# Patient Record
Sex: FEMALE | Race: WHITE | NOT HISPANIC OR LATINO | URBAN - METROPOLITAN AREA
[De-identification: names, ages, dates, MRNs, and addresses within clinical notes are randomized per-mention and may not be internally consistent; named-entity substitution may affect disease eponyms.]

---

## 2019-09-16 PROBLEM — H40.053 OCULAR HYPERTENSION: Noted: 2020-03-04

## 2019-09-16 PROBLEM — H04.123 DRY EYE SYNDROME: Noted: 2021-05-03

## 2019-09-16 PROBLEM — H40.1132 POAG, MODERATE: Noted: 2021-05-17

## 2019-09-16 PROBLEM — Z96.1 PSEUDOPHAKIA: Noted: 2021-08-30

## 2019-09-16 PROBLEM — H35.372 EPIRETINAL MEMBRANE: Status: STABILIZING | Noted: 2021-08-30 | Resolved: 2020-05-22

## 2019-09-16 PROBLEM — H04.123 DRY EYE SYNDROME: Status: ACTIVE | Noted: 2021-05-03 | Resolved: 2020-05-22

## 2019-09-16 PROBLEM — H17.13 CENTRAL CORNEAL OPACITY: Noted: 2019-12-27

## 2019-09-16 PROBLEM — H16.013 CENTRAL CORNEAL ULCER: Noted: 2019-09-16

## 2019-09-16 PROBLEM — H17.13 CENTRAL CORNEAL OPACITY: Status: STABILIZING | Noted: 2019-12-27 | Resolved: 2020-05-22

## 2019-09-16 PROBLEM — H35.372 EPIRETINAL MEMBRANE: Noted: 2021-08-30

## 2019-09-16 PROBLEM — H25.11 NUCLEAR SCLEROSIS: Noted: 2021-05-03

## 2019-09-16 PROBLEM — H40.1132 POAG, MODERATE: Status: STABILIZING | Noted: 2021-05-17 | Resolved: 2020-05-22

## 2019-09-16 PROBLEM — G44.209 TENSION HEADACHE: Noted: 2020-05-22

## 2019-09-16 PROBLEM — Z96.1 PSEUDOPHAKIA: Status: STABILIZING | Noted: 2021-08-30 | Resolved: 2020-05-22

## 2019-09-16 PROBLEM — H52.4 PRESBYOPIA: Noted: 2021-08-30

## 2019-09-16 PROBLEM — H17.13 CENTRAL CORNEAL OPACITY: Noted: 2019-12-27 | Resolved: 2020-05-22

## 2019-09-16 PROBLEM — H25.813 COMBINED SENILE CATARACT: Noted: 2019-09-16

## 2021-08-30 ENCOUNTER — PREPPED CHART (OUTPATIENT)
Dept: URBAN - METROPOLITAN AREA CLINIC 93 | Facility: CLINIC | Age: 71
End: 2021-08-30

## 2021-08-30 PROBLEM — Z96.1 PSEUDOPHAKIA: Status: STABILIZING | Noted: 2021-08-30

## 2021-08-30 PROBLEM — H04.123 DRY EYE SYNDROME: Status: ACTIVE | Noted: 2021-05-03

## 2021-08-30 PROBLEM — H17.13 CENTRAL CORNEAL OPACITY: Status: STABILIZING | Noted: 2019-12-27

## 2021-08-30 PROBLEM — H40.1132 POAG, MODERATE: Status: STABILIZING | Noted: 2021-05-17 | Resolved: 2020-05-22

## 2021-08-30 PROBLEM — H40.1132 POAG, MODERATE: Status: STABILIZING | Noted: 2021-05-17

## 2021-08-30 PROBLEM — H04.123 DRY EYE SYNDROME: Status: ACTIVE | Noted: 2021-05-03 | Resolved: 2020-05-22

## 2021-08-30 PROBLEM — Z96.1 PSEUDOPHAKIA: Status: STABILIZING | Noted: 2021-08-30 | Resolved: 2020-05-22

## 2021-08-30 PROBLEM — H35.372 EPIRETINAL MEMBRANE: Status: STABILIZING | Noted: 2021-08-30

## 2021-08-30 PROBLEM — H35.372 EPIRETINAL MEMBRANE: Status: STABILIZING | Noted: 2021-08-30 | Resolved: 2020-05-22

## 2021-08-30 PROBLEM — H17.13 CENTRAL CORNEAL OPACITY: Status: STABILIZING | Noted: 2019-12-27 | Resolved: 2020-05-22

## 2021-08-30 PROBLEM — H17.13 CENTRAL CORNEAL OPACITY: Status: IMPROVING | Noted: 2019-12-27

## 2021-10-15 ENCOUNTER — APPOINTMENT (OUTPATIENT)
Age: 71
Setting detail: DERMATOLOGY
End: 2021-10-22

## 2021-10-15 DIAGNOSIS — L82.0 INFLAMED SEBORRHEIC KERATOSIS: ICD-10-CM

## 2021-10-15 DIAGNOSIS — L60.1 ONYCHOLYSIS: ICD-10-CM

## 2021-10-15 DIAGNOSIS — L82.1 OTHER SEBORRHEIC KERATOSIS: ICD-10-CM

## 2021-10-15 PROBLEM — D23.61 OTHER BENIGN NEOPLASM OF SKIN OF RIGHT UPPER LIMB, INCLUDING SHOULDER: Status: ACTIVE | Noted: 2021-10-15

## 2021-10-15 PROCEDURE — OTHER ADDITIONAL NOTES: OTHER

## 2021-10-15 PROCEDURE — OTHER COUNSELING: OTHER

## 2021-10-15 PROCEDURE — OTHER LIQUID NITROGEN: OTHER

## 2021-10-15 PROCEDURE — 99202 OFFICE O/P NEW SF 15 MIN: CPT | Mod: 25

## 2021-10-15 PROCEDURE — OTHER MIPS QUALITY: OTHER

## 2021-10-15 PROCEDURE — 17110 DESTRUCT B9 LESION 1-14: CPT

## 2021-10-15 ASSESSMENT — LOCATION ZONE DERM
LOCATION ZONE: TOE
LOCATION ZONE: TRUNK

## 2021-10-15 ASSESSMENT — LOCATION DETAILED DESCRIPTION DERM
LOCATION DETAILED: LEFT INFERIOR LATERAL UPPER BACK
LOCATION DETAILED: LEFT DISTAL PLANTAR GREAT TOE

## 2021-10-15 ASSESSMENT — LOCATION SIMPLE DESCRIPTION DERM
LOCATION SIMPLE: LEFT UPPER BACK
LOCATION SIMPLE: LEFT GREAT TOE

## 2021-10-15 NOTE — PROCEDURE: ADDITIONAL NOTES
Detail Level: Simple
Additional Notes: Patient defers oral medication treatment.
Render Risk Assessment In Note?: no

## 2021-10-15 NOTE — PROCEDURE: LIQUID NITROGEN
Render Post-Care Instructions In Note?: no
Duration Of Freeze Thaw-Cycle (Seconds): 5-10
Show Topical Anesthesia Variable?: Yes
Medical Necessity Clause: This procedure was medically necessary because the lesions that were treated were:
Medical Necessity Information: It is in your best interest to select a reason for this procedure from the list below. All of these items fulfill various CMS LCD requirements except the new and changing color options.
Detail Level: Detailed
Post-Care Instructions: I reviewed with the patient in detail post-care instructions. Patient is to wear sunprotection, and avoid picking at any of the treated lesions. Pt may apply Vaseline to crusted or scabbing areas.
Consent: The patient's consent was obtained including but not limited to risks of crusting, scabbing, blistering, scarring, darker or lighter pigmentary change, recurrence, incomplete removal and infection.
Number Of Freeze-Thaw Cycles: 2 freeze-thaw cycles

## 2021-10-18 ENCOUNTER — PROBLEM (OUTPATIENT)
Dept: URBAN - METROPOLITAN AREA CLINIC 93 | Facility: CLINIC | Age: 71
End: 2021-10-18

## 2021-10-18 DIAGNOSIS — J32.0: ICD-10-CM

## 2021-10-18 PROCEDURE — 92012 INTRM OPH EXAM EST PATIENT: CPT

## 2021-10-18 ASSESSMENT — VISUAL ACUITY
OD_CC: 20/30
OS_CC: 20/40

## 2021-10-18 ASSESSMENT — TONOMETRY: OS_IOP_MMHG: 16

## 2021-11-01 ENCOUNTER — FOLLOW UP (OUTPATIENT)
Dept: URBAN - METROPOLITAN AREA CLINIC 93 | Facility: CLINIC | Age: 71
End: 2021-11-01

## 2021-11-01 DIAGNOSIS — H40.1132: ICD-10-CM

## 2021-11-01 DIAGNOSIS — H35.372: ICD-10-CM

## 2021-11-01 PROCEDURE — 92134 CPTRZ OPH DX IMG PST SGM RTA: CPT

## 2021-11-01 PROCEDURE — 92012 INTRM OPH EXAM EST PATIENT: CPT

## 2021-11-01 ASSESSMENT — VISUAL ACUITY
OD_CC: 20/25-3
OS_CC: 20/40

## 2021-11-01 ASSESSMENT — TONOMETRY
OD_IOP_MMHG: 14
OS_IOP_MMHG: 13

## 2022-03-30 ENCOUNTER — FOLLOW UP (OUTPATIENT)
Dept: URBAN - METROPOLITAN AREA CLINIC 93 | Facility: CLINIC | Age: 72
End: 2022-03-30

## 2022-03-30 DIAGNOSIS — H40.1132: ICD-10-CM

## 2022-03-30 DIAGNOSIS — H35.372: ICD-10-CM

## 2022-03-30 PROCEDURE — 92012 INTRM OPH EXAM EST PATIENT: CPT

## 2022-03-30 ASSESSMENT — TONOMETRY
OD_IOP_MMHG: 14
OS_IOP_MMHG: 14

## 2022-03-30 ASSESSMENT — VISUAL ACUITY
OD_CC: 20/25
OS_CC: 20/30

## 2022-07-11 ENCOUNTER — COMPLETE EYE EXAM (OUTPATIENT)
Dept: URBAN - METROPOLITAN AREA CLINIC 93 | Facility: CLINIC | Age: 72
End: 2022-07-11

## 2022-07-11 DIAGNOSIS — H52.13: ICD-10-CM

## 2022-07-11 DIAGNOSIS — H52.4: ICD-10-CM

## 2022-07-11 DIAGNOSIS — H04.123: ICD-10-CM

## 2022-07-11 DIAGNOSIS — H40.1132: ICD-10-CM

## 2022-07-11 DIAGNOSIS — H52.223: ICD-10-CM

## 2022-07-11 DIAGNOSIS — H35.372: ICD-10-CM

## 2022-07-11 PROCEDURE — 92014 COMPRE OPH EXAM EST PT 1/>: CPT

## 2022-07-11 PROCEDURE — 92134 CPTRZ OPH DX IMG PST SGM RTA: CPT | Mod: NC

## 2022-07-11 PROCEDURE — 92015 DETERMINE REFRACTIVE STATE: CPT | Mod: GY

## 2022-07-11 PROCEDURE — 92133 CPTRZD OPH DX IMG PST SGM ON: CPT

## 2022-07-11 ASSESSMENT — TONOMETRY
OD_IOP_MMHG: 17
OS_IOP_MMHG: 17

## 2022-07-11 ASSESSMENT — VISUAL ACUITY
OS_CC: 20/25-2
OU_CC: J1+
OD_CC: 20/25

## 2022-11-16 ENCOUNTER — PROBLEM (OUTPATIENT)
Dept: URBAN - METROPOLITAN AREA CLINIC 93 | Facility: CLINIC | Age: 72
End: 2022-11-16

## 2022-11-16 DIAGNOSIS — H35.372: ICD-10-CM

## 2022-11-16 DIAGNOSIS — H35.363: ICD-10-CM

## 2022-11-16 PROCEDURE — 92012 INTRM OPH EXAM EST PATIENT: CPT

## 2022-11-16 PROCEDURE — 92134 CPTRZ OPH DX IMG PST SGM RTA: CPT

## 2022-11-16 ASSESSMENT — TONOMETRY
OD_IOP_MMHG: 17
OS_IOP_MMHG: 15

## 2022-11-16 ASSESSMENT — VISUAL ACUITY
OD_CC: 20/25-1
OS_CC: 20/30-1

## 2023-03-28 ENCOUNTER — APPOINTMENT (OUTPATIENT)
Dept: URBAN - METROPOLITAN AREA CLINIC 276 | Age: 73
Setting detail: DERMATOLOGY
End: 2023-04-02

## 2023-03-28 DIAGNOSIS — L30.4 ERYTHEMA INTERTRIGO: ICD-10-CM

## 2023-03-28 PROCEDURE — OTHER MIPS QUALITY: OTHER

## 2023-03-28 PROCEDURE — 99213 OFFICE O/P EST LOW 20 MIN: CPT

## 2023-03-28 PROCEDURE — OTHER COUNSELING: OTHER

## 2023-03-28 PROCEDURE — OTHER PRESCRIPTION: OTHER

## 2023-03-28 RX ORDER — DESONIDE 0.5 MG/G
CREAM TOPICAL
Qty: 60 | Refills: 3 | Status: ERX | COMMUNITY
Start: 2023-03-28

## 2023-03-28 RX ORDER — KETOCONAZOLE 20 MG/G
CREAM TOPICAL
Qty: 60 | Refills: 3 | Status: ERX | COMMUNITY
Start: 2023-03-28

## 2023-03-28 ASSESSMENT — LOCATION SIMPLE DESCRIPTION DERM: LOCATION SIMPLE: LEFT BREAST

## 2023-03-28 ASSESSMENT — LOCATION ZONE DERM: LOCATION ZONE: TRUNK

## 2023-03-28 ASSESSMENT — LOCATION DETAILED DESCRIPTION DERM: LOCATION DETAILED: LEFT INFRAMAMMARY CREASE (INNER QUADRANT)

## 2023-03-28 NOTE — HPI: RASH
How Severe Is Your Rash?: moderate
Is This A New Presentation, Or A Follow-Up?: Rash
Additional History: Medication helped until 6th Covid shot then flare up.

## 2023-03-28 NOTE — PROCEDURE: MIPS QUALITY
Quality 431: Preventive Care And Screening: Unhealthy Alcohol Use - Screening: Patient not identified as an unhealthy alcohol user when screened for unhealthy alcohol use using a systematic screening method
Quality 130: Documentation Of Current Medications In The Medical Record: Current Medications Documented
Quality 154 Part B: Falls: Risk Screening (Should Be Reported With Measure 155.): Patient screened for future fall risk; documentation of two or more falls in the past year or any fall with injury in the past year
Quality 154 Part A: Falls: Risk Assessment (Should Be Reported With Measure 155.): Falls risk assessment completed and documented in the past 12 months.
Detail Level: Detailed
Quality 226: Preventive Care And Screening: Tobacco Use: Screening And Cessation Intervention: Patient screened for tobacco use and is an ex/non-smoker
Quality 111:Pneumonia Vaccination Status For Older Adults: Pneumococcal vaccine (PPSV23) administered on or after patient’s 60th birthday and before the end of the measurement period
Quality 110: Preventive Care And Screening: Influenza Immunization: Influenza Immunization previously received during influenza season

## 2023-03-29 ENCOUNTER — RX ONLY (RX ONLY)
Age: 73
End: 2023-03-29

## 2023-03-29 RX ORDER — HYDROCORTISONE 25 MG/G
CREAM TOPICAL
Qty: 30 | Refills: 3 | Status: ERX | COMMUNITY
Start: 2023-03-29

## 2023-03-29 RX ORDER — ALCLOMETASONE DIPROPIONATE 0.5 MG/G
CREAM TOPICAL
Qty: 60 | Refills: 2 | Status: ERX | COMMUNITY
Start: 2023-03-29

## 2023-04-24 ENCOUNTER — FOLLOW UP (OUTPATIENT)
Dept: URBAN - METROPOLITAN AREA CLINIC 93 | Facility: CLINIC | Age: 73
End: 2023-04-24

## 2023-04-24 DIAGNOSIS — H40.1132: ICD-10-CM

## 2023-04-24 DIAGNOSIS — H52.13: ICD-10-CM

## 2023-04-24 DIAGNOSIS — H52.4: ICD-10-CM

## 2023-04-24 DIAGNOSIS — Z96.1: ICD-10-CM

## 2023-04-24 DIAGNOSIS — H35.363: ICD-10-CM

## 2023-04-24 DIAGNOSIS — H52.223: ICD-10-CM

## 2023-04-24 DIAGNOSIS — H35.372: ICD-10-CM

## 2023-04-24 DIAGNOSIS — H04.123: ICD-10-CM

## 2023-04-24 PROCEDURE — 92012 INTRM OPH EXAM EST PATIENT: CPT

## 2023-04-24 PROCEDURE — 92133 CPTRZD OPH DX IMG PST SGM ON: CPT

## 2023-04-24 PROCEDURE — 92083 EXTENDED VISUAL FIELD XM: CPT

## 2023-04-24 ASSESSMENT — TONOMETRY
OS_IOP_MMHG: 14
OD_IOP_MMHG: 16

## 2023-04-24 ASSESSMENT — VISUAL ACUITY
OS_CC: 20/25
OD_CC: 20/30-1

## 2023-10-11 ENCOUNTER — FOLLOW UP (OUTPATIENT)
Dept: URBAN - METROPOLITAN AREA CLINIC 93 | Facility: CLINIC | Age: 73
End: 2023-10-11

## 2023-10-11 DIAGNOSIS — H04.123: ICD-10-CM

## 2023-10-11 DIAGNOSIS — H40.1132: ICD-10-CM

## 2023-10-11 DIAGNOSIS — H17.13: ICD-10-CM

## 2023-10-11 PROCEDURE — 92012 INTRM OPH EXAM EST PATIENT: CPT

## 2023-10-11 ASSESSMENT — VISUAL ACUITY: OS_SC: 20/40

## 2023-10-11 ASSESSMENT — TONOMETRY: OS_IOP_MMHG: 16

## 2024-01-24 ENCOUNTER — ESTABLISHED COMPREHENSIVE EXAM (OUTPATIENT)
Dept: URBAN - METROPOLITAN AREA CLINIC 93 | Facility: CLINIC | Age: 74
End: 2024-01-24

## 2024-01-24 DIAGNOSIS — Z96.1: ICD-10-CM

## 2024-01-24 DIAGNOSIS — H17.13: ICD-10-CM

## 2024-01-24 DIAGNOSIS — H35.363: ICD-10-CM

## 2024-01-24 DIAGNOSIS — H04.123: ICD-10-CM

## 2024-01-24 DIAGNOSIS — H35.372: ICD-10-CM

## 2024-01-24 DIAGNOSIS — H40.1132: ICD-10-CM

## 2024-01-24 PROCEDURE — 92134 CPTRZ OPH DX IMG PST SGM RTA: CPT

## 2024-01-24 PROCEDURE — 92014 COMPRE OPH EXAM EST PT 1/>: CPT

## 2024-01-24 ASSESSMENT — TONOMETRY
OD_IOP_MMHG: 15
OS_IOP_MMHG: 15

## 2024-01-24 ASSESSMENT — VISUAL ACUITY
OS_CC: 20/30+2
OU_CC: J1
OD_CC: 20/30

## 2024-02-26 ENCOUNTER — FOLLOW UP (OUTPATIENT)
Dept: URBAN - METROPOLITAN AREA CLINIC 93 | Facility: CLINIC | Age: 74
End: 2024-02-26

## 2024-02-26 DIAGNOSIS — H04.123: ICD-10-CM

## 2024-02-26 PROCEDURE — 83861 MICROFLUID ANALY TEARS: CPT | Mod: QW,RT,QW,LT

## 2024-02-26 PROCEDURE — 92012 INTRM OPH EXAM EST PATIENT: CPT

## 2024-02-26 PROCEDURE — 83861 MICROFLUID ANALY TEARS: CPT | Mod: QW,LT,QW,RT

## 2024-02-26 ASSESSMENT — TONOMETRY
OS_IOP_MMHG: 15
OD_IOP_MMHG: 16

## 2024-02-26 ASSESSMENT — VISUAL ACUITY
OS_CC: 20/25-2
OD_CC: 20/25

## 2024-06-28 ENCOUNTER — ESTABLISHED COMPREHENSIVE EXAM (OUTPATIENT)
Dept: URBAN - METROPOLITAN AREA CLINIC 93 | Facility: CLINIC | Age: 74
End: 2024-06-28

## 2024-06-28 DIAGNOSIS — H52.223: ICD-10-CM

## 2024-06-28 DIAGNOSIS — H17.13: ICD-10-CM

## 2024-06-28 DIAGNOSIS — H04.123: ICD-10-CM

## 2024-06-28 DIAGNOSIS — H35.363: ICD-10-CM

## 2024-06-28 DIAGNOSIS — H52.4: ICD-10-CM

## 2024-06-28 DIAGNOSIS — H35.372: ICD-10-CM

## 2024-06-28 DIAGNOSIS — H40.1132: ICD-10-CM

## 2024-06-28 DIAGNOSIS — H52.13: ICD-10-CM

## 2024-06-28 DIAGNOSIS — Z96.1: ICD-10-CM

## 2024-06-28 PROCEDURE — 92015 DETERMINE REFRACTIVE STATE: CPT | Mod: GY

## 2024-06-28 PROCEDURE — 92133 CPTRZD OPH DX IMG PST SGM ON: CPT

## 2024-06-28 PROCEDURE — 92083 EXTENDED VISUAL FIELD XM: CPT

## 2024-06-28 PROCEDURE — 92134 CPTRZ OPH DX IMG PST SGM RTA: CPT | Mod: NC

## 2024-06-28 PROCEDURE — 92014 COMPRE OPH EXAM EST PT 1/>: CPT

## 2024-06-28 ASSESSMENT — TONOMETRY
OD_IOP_MMHG: 14
OS_IOP_MMHG: 14

## 2024-06-28 ASSESSMENT — VISUAL ACUITY
OS_CC: 20/30
OD_CC: 20/40

## 2024-08-28 ENCOUNTER — APPOINTMENT (OUTPATIENT)
Dept: URBAN - METROPOLITAN AREA CLINIC 276 | Age: 74
Setting detail: DERMATOLOGY
End: 2024-09-05

## 2024-08-28 DIAGNOSIS — T07XXXA INSECT BITE, NONVENOMOUS, OF OTHER, MULTIPLE, AND UNSPECIFIED SITES, WITHOUT MENTION OF INFECTION: ICD-10-CM

## 2024-08-28 DIAGNOSIS — L82.0 INFLAMED SEBORRHEIC KERATOSIS: ICD-10-CM

## 2024-08-28 PROBLEM — S20.461A INSECT BITE (NONVENOMOUS) OF RIGHT BACK WALL OF THORAX, INITIAL ENCOUNTER: Status: ACTIVE | Noted: 2024-08-28

## 2024-08-28 PROBLEM — S30.861A INSECT BITE (NONVENOMOUS) OF ABDOMINAL WALL, INITIAL ENCOUNTER: Status: ACTIVE | Noted: 2024-08-28

## 2024-08-28 PROCEDURE — OTHER PRESCRIPTION: OTHER

## 2024-08-28 PROCEDURE — OTHER LIQUID NITROGEN: OTHER

## 2024-08-28 PROCEDURE — 99212 OFFICE O/P EST SF 10 MIN: CPT | Mod: 25

## 2024-08-28 PROCEDURE — OTHER COUNSELING: OTHER

## 2024-08-28 PROCEDURE — OTHER MIPS QUALITY: OTHER

## 2024-08-28 PROCEDURE — 17110 DESTRUCT B9 LESION 1-14: CPT

## 2024-08-28 RX ORDER — BETAMETHASONE DIPROPIONATE 0.5 MG/G
CREAM TOPICAL
Qty: 45 | Refills: 1 | Status: ERX | COMMUNITY
Start: 2024-08-28

## 2024-08-28 ASSESSMENT — LOCATION ZONE DERM: LOCATION ZONE: TRUNK

## 2024-08-28 ASSESSMENT — LOCATION DETAILED DESCRIPTION DERM
LOCATION DETAILED: LEFT INFERIOR LATERAL UPPER BACK
LOCATION DETAILED: RIGHT MEDIAL UPPER BACK
LOCATION DETAILED: PERIUMBILICAL SKIN

## 2024-08-28 ASSESSMENT — LOCATION SIMPLE DESCRIPTION DERM
LOCATION SIMPLE: LEFT UPPER BACK
LOCATION SIMPLE: RIGHT UPPER BACK
LOCATION SIMPLE: ABDOMEN

## 2024-08-28 NOTE — PROCEDURE: MIPS QUALITY
Quality 47: Advance Care Plan: Advance Care Planning discussed and documented in the medical record; patient did not wish or was not able to name a surrogate decision maker or provide an advance care plan.
Quality 176: Tuberculosis Screening Prior To First Course Of Biologic And/Or Immune Response Modifier Therapy: Patient receiving first-time biologic and/or immune response modifier therapy, TB Screening Performed and Results Interpreted within 12 months
Quality 226: Preventive Care And Screening: Tobacco Use: Screening And Cessation Intervention: Patient screened for tobacco use and is an ex/non-smoker
Quality 410: Psoriasis Clinical Response To Oral Systemic Or Biologic Medications: Psoriasis Assessment Tool Documented, Met Specified Benchmark
Quality 358: Patient-Centered Surgical Risk Assessment And Communication: Documentation of patient-specific risk assessment with a risk calculator based on multi-institutional clinical data, the specific risk calculator used, and communication of risk assessment from risk calculator with the patient or family.
Quality 485: Psoriasis - Improvement In Patient-Reported Itch Severity: Itch severity assessment score is reduced by 2 or more points from the initial (index) assessment score to the follow-up visit score
Quality 137: Melanoma: Continuity Of Care - Recall System: Patient information entered into a recall system that includes: target date for the next exam specified AND a process to follow up with patients regarding missed or unscheduled appointments
Detail Level: Detailed

## 2024-08-28 NOTE — PROCEDURE: LIQUID NITROGEN
Render Note In Bullet Format When Appropriate: No
Number Of Freeze-Thaw Cycles: 2 freeze-thaw cycles
Medical Necessity Clause: This procedure was medically necessary because the lesions that were treated were:
Show Spray Paint Technique Variable?: Yes
Detail Level: Zone
Medical Necessity Information: It is in your best interest to select a reason for this procedure from the list below. All of these items fulfill various CMS LCD requirements except the new and changing color options.
Post-Care Instructions: I reviewed with the patient in detail post-care instructions. Patient is to wear sunprotection, and avoid picking at any of the treated lesions. Pt may apply Vaseline to crusted or scabbing areas.
Spray Paint Text: The liquid nitrogen was applied to the skin utilizing a spray paint frosting technique.
Consent: The patient's consent was obtained including but not limited to risks of crusting, scabbing, blistering, scarring, darker or lighter pigmentary change, recurrence, incomplete removal and infection.

## 2024-10-14 ENCOUNTER — PROBLEM (OUTPATIENT)
Dept: URBAN - METROPOLITAN AREA CLINIC 93 | Facility: CLINIC | Age: 74
End: 2024-10-14

## 2024-10-14 DIAGNOSIS — H17.13: ICD-10-CM

## 2024-10-14 DIAGNOSIS — H35.363: ICD-10-CM

## 2024-10-14 DIAGNOSIS — H35.372: ICD-10-CM

## 2024-10-14 DIAGNOSIS — H40.1132: ICD-10-CM

## 2024-10-14 DIAGNOSIS — Z96.1: ICD-10-CM

## 2024-10-14 DIAGNOSIS — H04.123: ICD-10-CM

## 2024-10-14 PROCEDURE — 92012 INTRM OPH EXAM EST PATIENT: CPT

## 2024-10-14 ASSESSMENT — VISUAL ACUITY
OS_CC: 20/25
OD_CC: 20/30-2

## 2024-10-14 ASSESSMENT — TONOMETRY
OS_IOP_MMHG: 14
OD_IOP_MMHG: 15

## 2025-03-26 ENCOUNTER — FOLLOW UP (OUTPATIENT)
Dept: URBAN - METROPOLITAN AREA CLINIC 93 | Facility: CLINIC | Age: 75
End: 2025-03-26

## 2025-03-26 DIAGNOSIS — H40.1132: ICD-10-CM

## 2025-03-26 DIAGNOSIS — H35.363: ICD-10-CM

## 2025-03-26 DIAGNOSIS — H35.372: ICD-10-CM

## 2025-03-26 PROCEDURE — 92134 CPTRZ OPH DX IMG PST SGM RTA: CPT

## 2025-03-26 PROCEDURE — 92012 INTRM OPH EXAM EST PATIENT: CPT

## 2025-03-26 PROCEDURE — 92133 CPTRZD OPH DX IMG PST SGM ON: CPT | Mod: NC

## 2025-03-26 ASSESSMENT — VISUAL ACUITY
OD_CC: 20/40-2
OS_CC: 20/30

## 2025-03-26 ASSESSMENT — TONOMETRY
OS_IOP_MMHG: 14
OD_IOP_MMHG: 14

## (undated) RX ORDER — AMOXICILLIN 500 MG/1
1 TABLET, FILM COATED ORAL TWICE A DAY
Start: 2021-10-18 | End: 2021-10-25